# Patient Record
Sex: FEMALE | Race: WHITE | NOT HISPANIC OR LATINO | Employment: OTHER | URBAN - METROPOLITAN AREA
[De-identification: names, ages, dates, MRNs, and addresses within clinical notes are randomized per-mention and may not be internally consistent; named-entity substitution may affect disease eponyms.]

---

## 2018-03-13 ENCOUNTER — ESTABLISHED COMPREHENSIVE EXAM (OUTPATIENT)
Dept: URBAN - METROPOLITAN AREA CLINIC 40 | Facility: CLINIC | Age: 73
End: 2018-03-13

## 2018-03-13 DIAGNOSIS — H02.831: ICD-10-CM

## 2018-03-13 DIAGNOSIS — H02.834: ICD-10-CM

## 2018-03-13 DIAGNOSIS — Z96.1: ICD-10-CM

## 2018-03-13 DIAGNOSIS — H04.123: ICD-10-CM

## 2018-03-13 PROCEDURE — 92499OP2 OPTOMAP RETINAL SCREENING BOTH EYES

## 2018-03-13 PROCEDURE — G8427 DOCREV CUR MEDS BY ELIG CLIN: HCPCS

## 2018-03-13 PROCEDURE — 1036F TOBACCO NON-USER: CPT

## 2018-03-13 PROCEDURE — 92015 DETERMINE REFRACTIVE STATE: CPT

## 2018-03-13 PROCEDURE — 92014 COMPRE OPH EXAM EST PT 1/>: CPT

## 2018-03-13 ASSESSMENT — KERATOMETRY
OS_AXISANGLE2_DEGREES: 084
OD_AXISANGLE_DEGREES: 041
OD_K2POWER_DIOPTERS: 44.75
OS_K1POWER_DIOPTERS: 44.00
OD_K1POWER_DIOPTERS: 44.25
OD_AXISANGLE2_DEGREES: 131
OS_K2POWER_DIOPTERS: 44.75
OS_AXISANGLE_DEGREES: 174

## 2018-03-13 ASSESSMENT — TONOMETRY
OD_IOP_MMHG: 14
OS_IOP_MMHG: 13

## 2018-03-13 ASSESSMENT — VISUAL ACUITY
OS_CC: J1
OU_CC: J1
OD_CC: 20/25
OD_CC: J1
OS_CC: 20/20-1
OU_SC: J2
OU_CC: 20/20-1

## 2019-04-02 ENCOUNTER — ESTABLISHED COMPREHENSIVE EXAM (OUTPATIENT)
Dept: URBAN - METROPOLITAN AREA CLINIC 40 | Facility: CLINIC | Age: 74
End: 2019-04-02

## 2019-04-02 DIAGNOSIS — H04.123: ICD-10-CM

## 2019-04-02 DIAGNOSIS — H52.201: ICD-10-CM

## 2019-04-02 DIAGNOSIS — H02.834: ICD-10-CM

## 2019-04-02 DIAGNOSIS — H02.831: ICD-10-CM

## 2019-04-02 DIAGNOSIS — H52.4: ICD-10-CM

## 2019-04-02 PROCEDURE — 92014 COMPRE OPH EXAM EST PT 1/>: CPT

## 2019-04-02 PROCEDURE — 92015 DETERMINE REFRACTIVE STATE: CPT

## 2019-04-02 PROCEDURE — 92499OP2 OPTOMAP RETINAL SCREENING BOTH EYES

## 2019-04-02 ASSESSMENT — VISUAL ACUITY
OD_CC: J1-
OS_BAT: 20/30
OS_CC: 20/25
OU_SC: J3
OD_CC: 20/25-1
OU_CC: J1
OU_CC: 20/20
OU_SC: 20/20-1
OD_BAT: 20/30
OS_CC: J1-

## 2019-04-02 ASSESSMENT — KERATOMETRY
OD_K1POWER_DIOPTERS: 44.25
OD_K2POWER_DIOPTERS: 44.75
OS_AXISANGLE2_DEGREES: 084
OS_K1POWER_DIOPTERS: 44.00
OS_AXISANGLE_DEGREES: 174
OD_AXISANGLE2_DEGREES: 122
OS_K1POWER_DIOPTERS: 44
OS_K2POWER_DIOPTERS: 44.75
OD_AXISANGLE2_DEGREES: 131
OD_AXISANGLE_DEGREES: 041
OS_AXISANGLE2_DEGREES: 76
OD_AXISANGLE_DEGREES: 32
OS_AXISANGLE_DEGREES: 166

## 2019-04-02 ASSESSMENT — TONOMETRY
OD_IOP_MMHG: 14
OS_IOP_MMHG: 14

## 2020-01-03 PROBLEM — Z00.00 ENCOUNTER FOR PREVENTIVE HEALTH EXAMINATION: Status: ACTIVE | Noted: 2020-01-03

## 2020-01-06 ENCOUNTER — APPOINTMENT (OUTPATIENT)
Dept: NEUROSURGERY | Facility: CLINIC | Age: 75
End: 2020-01-06
Payer: MEDICARE

## 2020-01-06 DIAGNOSIS — G44.219 EPISODIC TENSION-TYPE HEADACHE, NOT INTRACTABLE: ICD-10-CM

## 2020-01-06 DIAGNOSIS — Z78.9 OTHER SPECIFIED HEALTH STATUS: ICD-10-CM

## 2020-01-06 DIAGNOSIS — Z82.3 FAMILY HISTORY OF STROKE: ICD-10-CM

## 2020-01-06 DIAGNOSIS — I67.1 CEREBRAL ANEURYSM, NONRUPTURED: ICD-10-CM

## 2020-01-06 PROCEDURE — 99205 OFFICE O/P NEW HI 60 MIN: CPT

## 2020-01-07 VITALS
DIASTOLIC BLOOD PRESSURE: 82 MMHG | OXYGEN SATURATION: 99 % | BODY MASS INDEX: 20.46 KG/M2 | WEIGHT: 135 LBS | SYSTOLIC BLOOD PRESSURE: 171 MMHG | HEART RATE: 69 BPM | HEIGHT: 68 IN | RESPIRATION RATE: 18 BRPM

## 2020-01-07 PROBLEM — Z82.3 FAMILY HISTORY OF STROKE: Status: ACTIVE | Noted: 2020-01-07

## 2020-01-07 PROBLEM — Z78.9 NO PERTINENT PAST MEDICAL HISTORY: Status: RESOLVED | Noted: 2020-01-07 | Resolved: 2020-01-07

## 2020-01-07 PROBLEM — G44.219 EPISODIC TENSION-TYPE HEADACHE, NOT INTRACTABLE: Status: ACTIVE | Noted: 2020-01-07

## 2020-01-07 PROBLEM — I67.1 BRAIN ANEURYSM: Status: ACTIVE | Noted: 2020-01-07

## 2020-01-07 NOTE — PHYSICAL EXAM
[Oriented To Time, Place, And Person] : oriented to person, place, and time [General Appearance - Alert] : alert [Motor Tone] : muscle tone was normal in all four extremities [Motor Handedness Right-Handed] : the patient is right hand dominant [Sclera] : the sclera and conjunctiva were normal [Outer Ear] : the ears and nose were normal in appearance [Neck Appearance] : the appearance of the neck was normal [] : no respiratory distress [Abnormal Walk] : normal gait [Skin Color & Pigmentation] : normal skin color and pigmentation

## 2020-01-13 NOTE — HISTORY OF PRESENT ILLNESS
[< 3 months] : less than 3 months [de-identified] :  is a right hand dominant women with no significant PmHx on hormone replacement who  began having dizziness approximately 3 months ago.  She was seen by her PCP who had ordered further testing and eventually pt saw  who had done an angiogram. 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 [FreeTextEntry1] : dizziness and headaches at night

## 2020-01-13 NOTE — REVIEW OF SYSTEMS
[Numbness] : numbness [Dizziness] : dizziness [Tingling] : tingling [Tension Headache] : tension-type headaches [Negative] : Heme/Lymph [de-identified] : Occasional numbness and tingling in upper extremities; headaches at night

## 2020-01-13 NOTE — PLAN
[FreeTextEntry1] : PLAN:\par 1-angiogram; Odonnell device for wide-neck basilar artery aneurysm\par 2-start DAPT 5 to 7 days prior to procedure\par 3- discuss case in cerebrovascular rounds on 01/16/20\par 4- Await patients decision

## 2020-01-13 NOTE — DATA REVIEWED
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

## 2020-01-13 NOTE — SURGICAL HISTORY
[] : No [de-identified] : THR 2010 [de-identified] : cataract extraction; myomectomy; hernia repair

## 2020-01-13 NOTE — ASSESSMENT
[FreeTextEntry1] :  is a 74 year old right hand dominant women with no significant PmHx. Pt presented with dizziness which began in October 2019.  She has had a brain MRI, CTA, and diagnostic cerebral angiogram by .  Pt is here today for a second opinion.  \par Pt denies weakness, pain, visual disturbance. Bria c/o occasional dizziness and headaches at night which she began to relate to stress.  \par Upon 's review of the CTA and the angiogram he had discussed initially attempting a Odonnell device for the wide-neck basilar artery aneurysm.  Pt would start ASA and plavix 5 to 7 days prior to the procedure.  If by chance the Odonnell is not a good fit for the size of the aneurysm he could then proceed to a flow diverter. Since the pt will be on DAPT within 2 weeks the pt can have the supraclinoid aneurysm coiled.  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

## 2020-01-13 NOTE — REASON FOR VISIT
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

## 2020-03-17 ENCOUNTER — ESTABLISHED COMPREHENSIVE EXAM (OUTPATIENT)
Dept: URBAN - METROPOLITAN AREA CLINIC 40 | Facility: CLINIC | Age: 75
End: 2020-03-17

## 2020-03-17 DIAGNOSIS — H52.4: ICD-10-CM

## 2020-03-17 DIAGNOSIS — H02.834: ICD-10-CM

## 2020-03-17 DIAGNOSIS — H04.123: ICD-10-CM

## 2020-03-17 DIAGNOSIS — H35.363: ICD-10-CM

## 2020-03-17 DIAGNOSIS — H02.831: ICD-10-CM

## 2020-03-17 PROCEDURE — 92014 COMPRE OPH EXAM EST PT 1/>: CPT

## 2020-03-17 PROCEDURE — 92250 FUNDUS PHOTOGRAPHY W/I&R: CPT

## 2020-03-17 PROCEDURE — 92015 DETERMINE REFRACTIVE STATE: CPT

## 2020-03-17 ASSESSMENT — VISUAL ACUITY
OD_CC: 20/30-1
OD_CC: J2
OD_SC: 20/30+2
OU_CC: J1
OS_SC: 20/20-1
OS_CC: J1
OU_SC: 20/20-1
OS_CC: 20/25
OU_CC: 20/25

## 2020-03-17 ASSESSMENT — KERATOMETRY
OS_AXISANGLE_DEGREES: 174
OS_K1POWER_DIOPTERS: 44.25
OD_AXISANGLE_DEGREES: 041
OS_AXISANGLE2_DEGREES: 084
OD_AXISANGLE_DEGREES: 41
OS_AXISANGLE2_DEGREES: 77
OD_AXISANGLE2_DEGREES: 122
OD_K1POWER_DIOPTERS: 44.25
OS_AXISANGLE_DEGREES: 166
OS_K1POWER_DIOPTERS: 44.00
OS_K2POWER_DIOPTERS: 44.75
OD_AXISANGLE_DEGREES: 32
OD_AXISANGLE2_DEGREES: 131
OD_K2POWER_DIOPTERS: 44.75
OS_K1POWER_DIOPTERS: 44
OS_AXISANGLE2_DEGREES: 76
OS_AXISANGLE_DEGREES: 167

## 2020-03-18 ENCOUNTER — EST. PATIENT EMERGENCY (OUTPATIENT)
Dept: URBAN - METROPOLITAN AREA CLINIC 40 | Facility: CLINIC | Age: 75
End: 2020-03-18

## 2020-03-18 DIAGNOSIS — H02.831: ICD-10-CM

## 2020-03-18 DIAGNOSIS — H35.363: ICD-10-CM

## 2020-03-18 DIAGNOSIS — H04.123: ICD-10-CM

## 2020-03-18 DIAGNOSIS — H02.834: ICD-10-CM

## 2020-03-18 PROCEDURE — 92012 INTRM OPH EXAM EST PATIENT: CPT

## 2020-03-18 ASSESSMENT — KERATOMETRY
OS_AXISANGLE2_DEGREES: 76
OS_K1POWER_DIOPTERS: 44.25
OD_AXISANGLE_DEGREES: 32
OD_AXISANGLE2_DEGREES: 131
OS_K1POWER_DIOPTERS: 44.00
OD_AXISANGLE_DEGREES: 041
OS_AXISANGLE_DEGREES: 167
OS_AXISANGLE2_DEGREES: 77
OD_AXISANGLE_DEGREES: 41
OS_K2POWER_DIOPTERS: 44.75
OS_K1POWER_DIOPTERS: 44
OD_K1POWER_DIOPTERS: 44.25
OS_AXISANGLE_DEGREES: 174
OS_AXISANGLE2_DEGREES: 084
OD_AXISANGLE2_DEGREES: 122
OD_K2POWER_DIOPTERS: 44.75
OS_AXISANGLE_DEGREES: 166

## 2020-05-07 ENCOUNTER — REFRACTION ONLY (OUTPATIENT)
Dept: URBAN - METROPOLITAN AREA CLINIC 40 | Facility: CLINIC | Age: 75
End: 2020-05-07

## 2020-05-07 DIAGNOSIS — H35.363: ICD-10-CM

## 2020-05-07 DIAGNOSIS — H02.834: ICD-10-CM

## 2020-05-07 DIAGNOSIS — H02.831: ICD-10-CM

## 2020-05-07 DIAGNOSIS — Z96.1: ICD-10-CM

## 2020-05-07 DIAGNOSIS — H52.4: ICD-10-CM

## 2020-05-07 DIAGNOSIS — H52.201: ICD-10-CM

## 2020-05-07 DIAGNOSIS — H04.123: ICD-10-CM

## 2020-05-07 PROCEDURE — 92015GRNC REFRACTION GLASSES RECHECK - NO CHARGE

## 2020-05-07 ASSESSMENT — KERATOMETRY
OD_AXISANGLE2_DEGREES: 122
OD_AXISANGLE2_DEGREES: 131
OS_AXISANGLE_DEGREES: 167
OS_K2POWER_DIOPTERS: 44.75
OS_AXISANGLE2_DEGREES: 76
OD_K2POWER_DIOPTERS: 44.75
OD_K1POWER_DIOPTERS: 44.25
OS_K1POWER_DIOPTERS: 44
OS_AXISANGLE2_DEGREES: 77
OS_AXISANGLE_DEGREES: 166
OS_K1POWER_DIOPTERS: 44.25
OS_AXISANGLE_DEGREES: 174
OD_AXISANGLE_DEGREES: 041
OS_K1POWER_DIOPTERS: 44.00
OD_AXISANGLE_DEGREES: 41
OD_AXISANGLE_DEGREES: 32
OS_AXISANGLE2_DEGREES: 084

## 2020-05-07 ASSESSMENT — VISUAL ACUITY
OU_CC: J2
OS_CC: 20/25
OU_CC: 20/20
OD_CC: 20/25